# Patient Record
Sex: FEMALE | Race: WHITE | NOT HISPANIC OR LATINO | URBAN - METROPOLITAN AREA
[De-identification: names, ages, dates, MRNs, and addresses within clinical notes are randomized per-mention and may not be internally consistent; named-entity substitution may affect disease eponyms.]

---

## 2019-01-05 ENCOUNTER — EMERGENCY (EMERGENCY)
Facility: HOSPITAL | Age: 39
LOS: 1 days | Discharge: HOME | End: 2019-01-05
Admitting: EMERGENCY MEDICINE
Payer: SELF-PAY

## 2019-01-05 VITALS
WEIGHT: 106.7 LBS | DIASTOLIC BLOOD PRESSURE: 76 MMHG | TEMPERATURE: 98 F | RESPIRATION RATE: 18 BRPM | HEIGHT: 66 IN | HEART RATE: 88 BPM | SYSTOLIC BLOOD PRESSURE: 110 MMHG | OXYGEN SATURATION: 99 %

## 2019-01-05 DIAGNOSIS — Z90.89 ACQUIRED ABSENCE OF OTHER ORGANS: Chronic | ICD-10-CM

## 2019-01-05 PROCEDURE — 99283 EMERGENCY DEPT VISIT LOW MDM: CPT

## 2019-01-05 RX ORDER — FLUTICASONE PROPIONATE 50 MCG
1 SPRAY, SUSPENSION NASAL
Qty: 1 | Refills: 0 | OUTPATIENT
Start: 2019-01-05 | End: 2019-02-03

## 2019-01-05 RX ORDER — IBUPROFEN 200 MG
1 TABLET ORAL
Qty: 30 | Refills: 0 | OUTPATIENT
Start: 2019-01-05 | End: 2019-01-14

## 2019-01-05 NOTE — ED ADULT TRIAGE NOTE - CHIEF COMPLAINT QUOTE
Patient c/o swelling nostrils , cough , sneezing , sore throat and bilateral ear pain for 6 days . Denies any fever nor chills .

## 2019-01-05 NOTE — ED PROVIDER NOTE - OBJECTIVE STATEMENT
37 y/o female with no sig PMHx c/o nasal congestion, sore throat and b/l earache x 6 days. no fever or chills. no ha or dizziness. pt tolerating po. no change in voice. no cp, sob, abd pain, n/v. no sick contacts. no recent travel. no further complaints.

## 2019-01-05 NOTE — ED PROVIDER NOTE - ENMT, MLM
Airway patent, Nasal mucosa clear. Mouth with normal mucosa. Throat has no vesicles, no oropharyngeal exudates and uvula is midline. no sinus tenderness. no edema. no erythema. TM's pearly gray and intact b/l

## 2019-01-05 NOTE — ED ADULT NURSE NOTE - NSIMPLEMENTINTERV_GEN_ALL_ED
Implemented All Universal Safety Interventions:  [place] to call system. Call bell, personal items and telephone within reach. Instruct patient to call for assistance. Room bathroom lighting operational. Non-slip footwear when patient is off stretcher. Physically safe environment: no spills, clutter or unnecessary equipment. Stretcher in lowest position, wheels locked, appropriate side rails in place.

## 2019-01-05 NOTE — ED PROVIDER NOTE - MEDICAL DECISION MAKING DETAILS
nasal congestion, sore throat and b/l earache. pt well appearing. VSS. suspect viral etiology. flonase, motrin, fluids, rest and f/u with pmd

## 2019-01-08 ENCOUNTER — EMERGENCY (EMERGENCY)
Facility: HOSPITAL | Age: 39
LOS: 1 days | Discharge: HOME | End: 2019-01-08
Attending: EMERGENCY MEDICINE | Admitting: EMERGENCY MEDICINE
Payer: SELF-PAY

## 2019-01-08 VITALS
DIASTOLIC BLOOD PRESSURE: 64 MMHG | WEIGHT: 108.69 LBS | RESPIRATION RATE: 20 BRPM | SYSTOLIC BLOOD PRESSURE: 102 MMHG | HEART RATE: 99 BPM | OXYGEN SATURATION: 100 % | HEIGHT: 66 IN | TEMPERATURE: 99 F

## 2019-01-08 DIAGNOSIS — J06.9 ACUTE UPPER RESPIRATORY INFECTION, UNSPECIFIED: ICD-10-CM

## 2019-01-08 DIAGNOSIS — H92.02 OTALGIA, LEFT EAR: ICD-10-CM

## 2019-01-08 DIAGNOSIS — R09.81 NASAL CONGESTION: ICD-10-CM

## 2019-01-08 DIAGNOSIS — Z79.899 OTHER LONG TERM (CURRENT) DRUG THERAPY: ICD-10-CM

## 2019-01-08 DIAGNOSIS — Z90.89 ACQUIRED ABSENCE OF OTHER ORGANS: Chronic | ICD-10-CM

## 2019-01-08 DIAGNOSIS — Z79.1 LONG TERM (CURRENT) USE OF NON-STEROIDAL ANTI-INFLAMMATORIES (NSAID): ICD-10-CM

## 2019-01-08 LAB — RAPID RVP RESULT: SIGNIFICANT CHANGE UP

## 2019-01-08 PROCEDURE — 99283 EMERGENCY DEPT VISIT LOW MDM: CPT

## 2019-01-08 PROCEDURE — 87581 M.PNEUMON DNA AMP PROBE: CPT

## 2019-01-08 PROCEDURE — 87486 CHLMYD PNEUM DNA AMP PROBE: CPT

## 2019-01-08 PROCEDURE — 87798 DETECT AGENT NOS DNA AMP: CPT

## 2019-01-08 PROCEDURE — 87633 RESP VIRUS 12-25 TARGETS: CPT

## 2019-01-08 RX ORDER — AZITHROMYCIN 500 MG/1
1 TABLET, FILM COATED ORAL
Qty: 6 | Refills: 0 | OUTPATIENT
Start: 2019-01-08 | End: 2019-01-12

## 2019-01-08 RX ORDER — FLUTICASONE PROPIONATE 50 MCG
1 SPRAY, SUSPENSION NASAL
Qty: 1 | Refills: 0 | OUTPATIENT
Start: 2019-01-08 | End: 2019-01-17

## 2019-01-08 NOTE — ED PROVIDER NOTE - MEDICAL DECISION MAKING DETAILS
Patient in ED w concern for cough, fever, and ear pain over the past 9 days.  Seen several days ago and diagnosed with viral syndrome - does not seem to be improving with supportive care.  Patient is non toxic in appearance on ED arrival today.  She is in no resp distress, lungs clear.  Posterior oropharynx clear.  RVP is sent as it was not previously completed.  I did reiterate likely viral etiology of symptoms again today with patient and family at bedside.  Given duration of patient's symptoms and that she is not feeling improved I will provide rx for azithromycin.  She is encouraged to try Flonase and pseudophed for 24-48 hours to see if she experiences any improvement in symptoms prior to initiation of abx therapy.  She is also given PCP referral and encouraged to follow up in 1-2 days for re eval to ensure symptoms are resolving.  She is instructed to return to ED immediately should symptoms worsen or if she has any concern prior to this recommended follow up.  Patient is aware of plan and verbalizes her understanding with  present at bedside.

## 2019-01-08 NOTE — ED ADULT NURSE NOTE - CHIEF COMPLAINT QUOTE
Patient c/o fever , cough with chest discomfort and sinus infection for 9 days , was seen here last saturday .

## 2019-01-08 NOTE — ED PROVIDER NOTE - ENMT NEGATIVE STATEMENT, MLM
Ears: + ear pain, no hearing problems. Nose: + nasal congestion.Mouth/Throat: no dysphagia, no hoarseness and no throat pain.Neck: no lumps, no pain, no stiffness and no swollen glands.

## 2019-01-08 NOTE — ED PROVIDER NOTE - OBJECTIVE STATEMENT
38 year old female with no known medical history presents to ED with concern for nasal congestion, cough productive of sputum and fever over the past 9 days.  Patient was seen in ED several days ago and advised of likely viral etiology and need for supportive care and follow up.  Patient returns to ED today, because despite use of mucinex and motrin, she is not feeling significantly improved.  Patient notes associated ear pressure, however denies sore throat, chest pain, shortness of breath, abdominal pain, nausea, vomiting or any additional acute complaints or concerns at this time.

## 2019-01-08 NOTE — ED PROVIDER NOTE - NSFOLLOWUPINSTRUCTIONS_ED_ALL_ED_FT
Please follow up with primary physician (info provided) in 1-2 days for re evaluation.  Please return to ER immediately should your symptoms worsen or if you have any concern prior to this recommended follow up.

## 2019-01-08 NOTE — ED PROVIDER NOTE - ENMT, MLM
Right TM is not completely visualized due to cerumen impaction.  Portion of EAC visualized on right appears clear without erythema.  Left TM w mild effusion, EAC clear on left.  Airway patent, Nasal mucosa clear. Mouth with normal mucosa. Throat has no vesicles, no oropharyngeal exudates and uvula is midline.

## 2019-01-09 DIAGNOSIS — B34.9 VIRAL INFECTION, UNSPECIFIED: ICD-10-CM

## 2019-01-09 DIAGNOSIS — H92.02 OTALGIA, LEFT EAR: ICD-10-CM

## 2019-01-09 DIAGNOSIS — Z79.1 LONG TERM (CURRENT) USE OF NON-STEROIDAL ANTI-INFLAMMATORIES (NSAID): ICD-10-CM

## 2019-01-09 DIAGNOSIS — J02.9 ACUTE PHARYNGITIS, UNSPECIFIED: ICD-10-CM

## 2019-01-09 DIAGNOSIS — H92.01 OTALGIA, RIGHT EAR: ICD-10-CM

## 2019-01-09 DIAGNOSIS — Z79.899 OTHER LONG TERM (CURRENT) DRUG THERAPY: ICD-10-CM
